# Patient Record
Sex: MALE | NOT HISPANIC OR LATINO | Employment: STUDENT | URBAN - METROPOLITAN AREA
[De-identification: names, ages, dates, MRNs, and addresses within clinical notes are randomized per-mention and may not be internally consistent; named-entity substitution may affect disease eponyms.]

---

## 2024-06-26 ENCOUNTER — APPOINTMENT (OUTPATIENT)
Dept: DERMATOLOGY | Facility: CLINIC | Age: 24
End: 2024-06-26
Payer: COMMERCIAL

## 2024-06-26 DIAGNOSIS — L57.9 SKIN CHANGES DUE TO CHRONIC EXPOSURE TO NONIONIZING RADIATION: ICD-10-CM

## 2024-06-26 DIAGNOSIS — L81.4 LENTIGO: ICD-10-CM

## 2024-06-26 DIAGNOSIS — L90.5 SCAR CONDITIONS AND FIBROSIS OF SKIN: ICD-10-CM

## 2024-06-26 DIAGNOSIS — D22.9 BENIGN NEVUS: Primary | ICD-10-CM

## 2024-06-26 PROCEDURE — 1036F TOBACCO NON-USER: CPT | Performed by: NURSE PRACTITIONER

## 2024-06-26 PROCEDURE — 99213 OFFICE O/P EST LOW 20 MIN: CPT | Performed by: NURSE PRACTITIONER

## 2024-06-26 NOTE — PATIENT INSTRUCTIONS

## 2025-04-03 ENCOUNTER — APPOINTMENT (OUTPATIENT)
Dept: CARDIOLOGY | Facility: HOSPITAL | Age: 25
End: 2025-04-03
Payer: COMMERCIAL

## 2025-04-03 ENCOUNTER — HOSPITAL ENCOUNTER (EMERGENCY)
Facility: HOSPITAL | Age: 25
Discharge: HOME | End: 2025-04-03
Attending: EMERGENCY MEDICINE
Payer: COMMERCIAL

## 2025-04-03 VITALS
TEMPERATURE: 97.8 F | OXYGEN SATURATION: 99 % | WEIGHT: 195 LBS | DIASTOLIC BLOOD PRESSURE: 83 MMHG | SYSTOLIC BLOOD PRESSURE: 127 MMHG | RESPIRATION RATE: 15 BRPM | BODY MASS INDEX: 27.92 KG/M2 | HEIGHT: 70 IN | HEART RATE: 61 BPM

## 2025-04-03 DIAGNOSIS — R00.2 PALPITATIONS: Primary | ICD-10-CM

## 2025-04-03 LAB
ALBUMIN SERPL BCP-MCNC: 4.7 G/DL (ref 3.4–5)
ALP SERPL-CCNC: 59 U/L (ref 33–120)
ALT SERPL W P-5'-P-CCNC: 12 U/L (ref 10–52)
ANION GAP SERPL CALC-SCNC: 7 MMOL/L (ref 10–20)
AST SERPL W P-5'-P-CCNC: 16 U/L (ref 9–39)
ATRIAL RATE: 51 BPM
BASOPHILS # BLD AUTO: 0.02 X10*3/UL (ref 0–0.1)
BASOPHILS NFR BLD AUTO: 0.2 %
BILIRUB SERPL-MCNC: 0.6 MG/DL (ref 0–1.2)
BUN SERPL-MCNC: 27 MG/DL (ref 6–23)
CALCIUM SERPL-MCNC: 9.4 MG/DL (ref 8.6–10.3)
CARDIAC TROPONIN I PNL SERPL HS: 3 NG/L (ref 0–20)
CHLORIDE SERPL-SCNC: 104 MMOL/L (ref 98–107)
CO2 SERPL-SCNC: 27 MMOL/L (ref 21–32)
CREAT SERPL-MCNC: 1.35 MG/DL (ref 0.5–1.3)
EGFRCR SERPLBLD CKD-EPI 2021: 76 ML/MIN/1.73M*2
EOSINOPHIL # BLD AUTO: 0.08 X10*3/UL (ref 0–0.7)
EOSINOPHIL NFR BLD AUTO: 1 %
ERYTHROCYTE [DISTWIDTH] IN BLOOD BY AUTOMATED COUNT: 11.8 % (ref 11.5–14.5)
GLUCOSE SERPL-MCNC: 86 MG/DL (ref 74–99)
HCT VFR BLD AUTO: 44.3 % (ref 41–52)
HGB BLD-MCNC: 15.8 G/DL (ref 13.5–17.5)
IMM GRANULOCYTES # BLD AUTO: 0.02 X10*3/UL (ref 0–0.7)
IMM GRANULOCYTES NFR BLD AUTO: 0.2 % (ref 0–0.9)
LYMPHOCYTES # BLD AUTO: 3.72 X10*3/UL (ref 1.2–4.8)
LYMPHOCYTES NFR BLD AUTO: 45.9 %
MAGNESIUM SERPL-MCNC: 2.13 MG/DL (ref 1.6–2.4)
MCH RBC QN AUTO: 30.2 PG (ref 26–34)
MCHC RBC AUTO-ENTMCNC: 35.7 G/DL (ref 32–36)
MCV RBC AUTO: 85 FL (ref 80–100)
MONOCYTES # BLD AUTO: 0.52 X10*3/UL (ref 0.1–1)
MONOCYTES NFR BLD AUTO: 6.4 %
NEUTROPHILS # BLD AUTO: 3.74 X10*3/UL (ref 1.2–7.7)
NEUTROPHILS NFR BLD AUTO: 46.3 %
NRBC BLD-RTO: 0 /100 WBCS (ref 0–0)
P AXIS: 152 DEGREES
PLATELET # BLD AUTO: 192 X10*3/UL (ref 150–450)
POTASSIUM SERPL-SCNC: 3.8 MMOL/L (ref 3.5–5.3)
PR INTERVAL: 156 MS
PROT SERPL-MCNC: 7.3 G/DL (ref 6.4–8.2)
Q ONSET: 251 MS
QRS COUNT: 8 BEATS
QRS DURATION: 110 MS
QT INTERVAL: 425 MS
QTC CALCULATION(BAZETT): 388 MS
QTC FREDERICIA: 400 MS
R AXIS: 41 DEGREES
RBC # BLD AUTO: 5.23 X10*6/UL (ref 4.5–5.9)
SODIUM SERPL-SCNC: 134 MMOL/L (ref 136–145)
T AXIS: 45 DEGREES
T OFFSET: 463 MS
VENTRICULAR RATE: 50 BPM
WBC # BLD AUTO: 8.1 X10*3/UL (ref 4.4–11.3)

## 2025-04-03 PROCEDURE — 83735 ASSAY OF MAGNESIUM: CPT | Performed by: EMERGENCY MEDICINE

## 2025-04-03 PROCEDURE — 85025 COMPLETE CBC W/AUTO DIFF WBC: CPT | Performed by: EMERGENCY MEDICINE

## 2025-04-03 PROCEDURE — 80053 COMPREHEN METABOLIC PANEL: CPT | Performed by: EMERGENCY MEDICINE

## 2025-04-03 PROCEDURE — 36415 COLL VENOUS BLD VENIPUNCTURE: CPT | Performed by: EMERGENCY MEDICINE

## 2025-04-03 PROCEDURE — 93005 ELECTROCARDIOGRAM TRACING: CPT

## 2025-04-03 PROCEDURE — 99284 EMERGENCY DEPT VISIT MOD MDM: CPT | Performed by: EMERGENCY MEDICINE

## 2025-04-03 PROCEDURE — 84484 ASSAY OF TROPONIN QUANT: CPT | Performed by: EMERGENCY MEDICINE

## 2025-04-03 ASSESSMENT — LIFESTYLE VARIABLES
TOTAL SCORE: 0
EVER FELT BAD OR GUILTY ABOUT YOUR DRINKING: NO
HAVE YOU EVER FELT YOU SHOULD CUT DOWN ON YOUR DRINKING: NO
EVER HAD A DRINK FIRST THING IN THE MORNING TO STEADY YOUR NERVES TO GET RID OF A HANGOVER: NO
HAVE PEOPLE ANNOYED YOU BY CRITICIZING YOUR DRINKING: NO

## 2025-04-03 ASSESSMENT — PAIN SCALES - GENERAL: PAINLEVEL_OUTOF10: 4

## 2025-04-03 ASSESSMENT — COLUMBIA-SUICIDE SEVERITY RATING SCALE - C-SSRS
2. HAVE YOU ACTUALLY HAD ANY THOUGHTS OF KILLING YOURSELF?: NO
1. IN THE PAST MONTH, HAVE YOU WISHED YOU WERE DEAD OR WISHED YOU COULD GO TO SLEEP AND NOT WAKE UP?: NO
6. HAVE YOU EVER DONE ANYTHING, STARTED TO DO ANYTHING, OR PREPARED TO DO ANYTHING TO END YOUR LIFE?: NO

## 2025-04-03 ASSESSMENT — PAIN - FUNCTIONAL ASSESSMENT: PAIN_FUNCTIONAL_ASSESSMENT: 0-10

## 2025-04-03 NOTE — ED PROVIDER NOTES
Patel Arshad is a 24 y.o. patient presenting to the ED for chest tightness and palpitations.  The patient states this has been intermittent over the last 2 days.  There does not seem to be any specific triggers.  He describes the palpitations as feeling like he has intermittent individual heartbeats that are much stronger than others.  He denies any shortness of breath.  No lightheadedness.  No syncope.  No recent fever or illness.  Has not had this happen previously.    Additional History Obtained from: None  Limitations to History: None  ------------------------------------------------------------------------------------------------------------------------------------------  Physical Exam:  Appearance: Alert, cooperative,   Skin: Warm, dry, appropriate color for ethnicity.  Eyes: Cornea clear. No scleral icterus or injection.   ENT: Mucous membranes moist.  Pulmonary: No accessory muscle use or stridor. Clear lung sounds bilaterally without rhonchi or wheezing.   Cardiac: Heart sounds regular without murmur. B/L radial pulses full and symmetric.   Abdomen: Soft, not tender.  No rebound or guarding.   Musculoskeletal: No gross deformities.   Neurological: Face symmetrical. Voice clear. Appropriately conversant.   Psychiatric: Appropriate mood and affect.    Medical Decision Making:  Chronic Medical Conditions Significantly Affecting Care:  has no past medical history on file.    Social Determinants of Health Significantly Affecting Care: None identified.  No recent alcohol.  Denies any history of drug use.    Differential Diagnosis Considered but not limited to: Given the patient's description PVCs or PACs are high on the differential.  Will obtain work up to evaluate for electrolyte disturbance or evidence of cardiac dysfunction which is less likely.  The patient does not have any tachycardia, shortness of breath, hypoxia to suggest PE.      External Records Reviewed:   I reviewed recent and relevant outside  records including:     Independent Interpretation of Studies: The following studies were ordered as part of the emergency department work up and independently interpreted by me. See ED Course for details.    CBC, CMP, magnesium, troponin are within normal limits.    On reevaluation the patient has not had any episodes while in the emergency department.  He has not had any cardiac dysrhythmia on my review of his telemetry monitoring.  I do believe he is stable for outpatient management.  Results, follow-up, return precautions were discussed.    ED Course as of 04/06/25 1212   u Apr 03, 2025   0209 EKG performed at 0206 and interpreted by me shows sinus rhythm at a rate of 50.  Intervals are normal.  The axis is normal.  There are no significant ST or T wave changes.  No STEMI. [SP]      ED Course User Index  [SP] Cynthia Zarate DO         Diagnoses as of 04/06/25 1212   Palpitations              Cynthia Zarate DO  04/06/25 1215

## 2025-07-16 ENCOUNTER — APPOINTMENT (OUTPATIENT)
Dept: DERMATOLOGY | Facility: CLINIC | Age: 25
End: 2025-07-16
Payer: COMMERCIAL

## 2025-07-16 DIAGNOSIS — L81.4 LENTIGO: ICD-10-CM

## 2025-07-16 DIAGNOSIS — L57.9 SKIN CHANGES DUE TO CHRONIC EXPOSURE TO NONIONIZING RADIATION: ICD-10-CM

## 2025-07-16 DIAGNOSIS — L90.5 SCAR CONDITIONS AND FIBROSIS OF SKIN: ICD-10-CM

## 2025-07-16 DIAGNOSIS — Z86.018 HX OF DYSPLASTIC NEVUS: ICD-10-CM

## 2025-07-16 DIAGNOSIS — D22.9 BENIGN NEVUS: Primary | ICD-10-CM

## 2025-07-16 PROCEDURE — 99213 OFFICE O/P EST LOW 20 MIN: CPT | Performed by: NURSE PRACTITIONER

## 2025-07-16 PROCEDURE — 1036F TOBACCO NON-USER: CPT | Performed by: NURSE PRACTITIONER

## 2025-07-16 NOTE — PROGRESS NOTES
Subjective     Patel Arshad is a 24 y.o. male who presents for the following: Skin Check.     Review of Systems:  No other skin or systemic complaints other than what is documented elsewhere in the note.    The following portions of the chart were reviewed this encounter and updated as appropriate:          Skin Cancer History  Biopsy Log Book  No skin cancers from Specimen Tracking.    Additional History      Specialty Problems    None       Objective   Well appearing patient in no apparent distress; mood and affect are within normal limits.    A full examination was performed including scalp, head, eyes, ears, nose, lips, neck, chest, axillae, abdomen, back, buttocks, bilateral upper extremities, bilateral lower extremities, hands, feet, fingers, toes, fingernails, and toenails. All findings within normal limits unless otherwise noted below.      Assessment/Plan   Skin Exam  1. BENIGN NEVUS  Generalized  Scattered, uniform and benign-appearing, regular brown melanocytic papules and macules.  The present appearance of the lesion is not worrisome but it should continue to be observed and testing/treatment may be warranted if change occurs.    Compared to baseline photos, no  changing lesions noted. A few new moles compared to 2018 photos, but normal dermoscopic features.     2. SKIN CHANGES DUE TO CHRONIC EXPOSURE TO NONIONIZING RADIATION  Generalized  Actinic changes in the form of freckles, lentigines and hyper/hypopigmentation   ABCDEs of melanoma and atypical moles were discussed with the patient.    Patient was instructed to perform monthly self skin examination.  We recommended that the patient have regular full skin exams given an increased risk of subsequent skin cancers.    The patient was instructed to use sun protective behaviors including use of broad spectrum sunscreens and sun protective clothing to reduce risk of skin cancers.    Warning signs of non-melanoma skin cancer discussed.  3.  LENTIGO  Generalized  Scattered tan macules in sun-exposed areas.  A solar lentigo (plural, solar lentigines), also known as a sun-induced freckle or senile lentigo, is a dark (hyperpigmented) lesion caused by natural or artificial ultraviolet (UV) light. Solar lentigines may be single or multiple. This type of lentigo is different from a simple lentigo (lentigo simplex) because it is caused by exposure to UV light. Solar lentigines are benign, but they do indicate excessive sun exposure, a risk factor for the development of skin cancer.    To prevent solar lentigines, avoid exposure to sunlight in midday (10 AM to 3 PM), wear sun-protective clothing (tightly woven clothes and hats), and apply sunscreen (SPF 30 UVA and UVB block).    The present appearance of the lesion is not worrisome but it should continue to be observed and testing/treatment may be warranted if change occurs.  4. SCAR CONDITIONS AND FIBROSIS OF SKIN (2)  Mid Back (2)  Well healed scar at the site(s) of prior treatment with no evidence of recurrence.        The scar is clear, there is no evidence of recurrence.  The present appearance of the scar is not worrisome but it should continue to be observed and testing/treatment may be warranted if change occurs.    5. HX OF DYSPLASTIC NEVUS  Generalized  Hx of 2 mild DN on the back (2020, 2021)    Return to clinic in 1 year for skin check/follow up or sooner if needed

## 2025-07-16 NOTE — PATIENT INSTRUCTIONS

## 2025-07-16 NOTE — Clinical Note
The present appearance of the lesion is not worrisome but it should continue to be observed and testing/treatment may be warranted if change occurs.    Compared to baseline photos, no  changing lesions noted. A few new moles compared to 2018 photos, but normal dermoscopic features.

## 2025-07-22 ENCOUNTER — APPOINTMENT (OUTPATIENT)
Dept: DERMATOLOGY | Facility: CLINIC | Age: 25
End: 2025-07-22
Payer: COMMERCIAL